# Patient Record
Sex: FEMALE | Race: WHITE | URBAN - METROPOLITAN AREA
[De-identification: names, ages, dates, MRNs, and addresses within clinical notes are randomized per-mention and may not be internally consistent; named-entity substitution may affect disease eponyms.]

---

## 2021-02-22 ENCOUNTER — OFFICE VISIT (OUTPATIENT)
Dept: URBAN - METROPOLITAN AREA CLINIC 94 | Facility: CLINIC | Age: 31
End: 2021-02-22
Payer: SELF-PAY

## 2021-02-22 ENCOUNTER — DASHBOARD ENCOUNTERS (OUTPATIENT)
Age: 31
End: 2021-02-22

## 2021-02-22 DIAGNOSIS — R10.13 EPIGASTRIC ABDOMINAL PAIN: ICD-10-CM

## 2021-02-22 DIAGNOSIS — K21.9 GASTROESOPHAGEAL REFLUX DISEASE, UNSPECIFIED WHETHER ESOPHAGITIS PRESENT: ICD-10-CM

## 2021-02-22 PROCEDURE — 99203 OFFICE O/P NEW LOW 30 MIN: CPT | Performed by: INTERNAL MEDICINE

## 2021-02-22 RX ORDER — BUPROPION HYDROCHLORIDE 300 MG/1
1 TABLET IN THE MORNING TABLET, EXTENDED RELEASE ORAL ONCE A DAY
Status: ACTIVE | COMMUNITY

## 2021-02-22 RX ORDER — ESCITALOPRAM OXALATE 20 MG/1
1 TABLET TABLET, FILM COATED ORAL ONCE A DAY
Status: ACTIVE | COMMUNITY

## 2021-02-22 RX ORDER — OMEPRAZOLE 40 MG/1
TAKE 1 CAPSULE BY ORAL ROUTE DAILY FOR 30 DAYS CAPSULE, DELAYED RELEASE PELLETS ORAL 1
Qty: 30 | Refills: 6 | OUTPATIENT
Start: 2021-02-22

## 2021-02-22 NOTE — HPI-OTHER HISTORIES
Pt presents for evaluation of chronic GERD. History of heartburn and acid reflux for > 10 years. Symptoms have progressively worsened. Currently c/o severe reflux daily. Symptoms worse at night. Epigastric burning and discomfort after eating. Tried OTC H2 blockers and PPIs without any improvement. Denies dysphagia, weight loss or rectal bleeding.

## 2021-04-07 ENCOUNTER — OFFICE VISIT (OUTPATIENT)
Dept: URBAN - METROPOLITAN AREA SURGERY CENTER 17 | Facility: SURGERY CENTER | Age: 31
End: 2021-04-07

## 2021-05-07 ENCOUNTER — CLAIMS CREATED FROM THE CLAIM WINDOW (OUTPATIENT)
Dept: URBAN - METROPOLITAN AREA CLINIC 4 | Facility: CLINIC | Age: 31
End: 2021-05-07
Payer: SELF-PAY

## 2021-05-07 ENCOUNTER — OFFICE VISIT (OUTPATIENT)
Dept: URBAN - METROPOLITAN AREA SURGERY CENTER 17 | Facility: SURGERY CENTER | Age: 31
End: 2021-05-07
Payer: SELF-PAY

## 2021-05-07 DIAGNOSIS — K31.89 GASTRIC PERFORATION WITHOUT ULCER: ICD-10-CM

## 2021-05-07 DIAGNOSIS — R10.13 ABDOMINAL DISCOMFORT, EPIGASTRIC: ICD-10-CM

## 2021-05-07 DIAGNOSIS — K21.9 ACID REFLUX: ICD-10-CM

## 2021-05-07 DIAGNOSIS — R11.2 ACUTE NAUSEA WITH NONBILIOUS VOMITING: ICD-10-CM

## 2021-05-07 DIAGNOSIS — K21.9 GASTRO-ESOPHAGEAL REFLUX DISEASE WITHOUT ESOPHAGITIS: ICD-10-CM

## 2021-05-07 DIAGNOSIS — K31.89 ACQUIRED DEFORMITY OF DUODENUM: ICD-10-CM

## 2021-05-07 PROCEDURE — 43239 EGD BIOPSY SINGLE/MULTIPLE: CPT | Performed by: INTERNAL MEDICINE

## 2021-05-07 PROCEDURE — 88312 SPECIAL STAINS GROUP 1: CPT | Performed by: PATHOLOGY

## 2021-05-07 PROCEDURE — 88305 TISSUE EXAM BY PATHOLOGIST: CPT | Performed by: PATHOLOGY

## 2021-05-07 PROCEDURE — G8907 PT DOC NO EVENTS ON DISCHARG: HCPCS | Performed by: INTERNAL MEDICINE

## 2021-05-07 RX ORDER — ESCITALOPRAM OXALATE 20 MG/1
1 TABLET TABLET, FILM COATED ORAL ONCE A DAY
Status: ACTIVE | COMMUNITY

## 2021-05-07 RX ORDER — BUPROPION HYDROCHLORIDE 300 MG/1
1 TABLET IN THE MORNING TABLET, EXTENDED RELEASE ORAL ONCE A DAY
Status: ACTIVE | COMMUNITY

## 2021-05-07 RX ORDER — OMEPRAZOLE 40 MG/1
TAKE 1 CAPSULE BY ORAL ROUTE DAILY FOR 30 DAYS CAPSULE, DELAYED RELEASE PELLETS ORAL 1
Qty: 30 | Refills: 6 | Status: ACTIVE | COMMUNITY
Start: 2021-02-22